# Patient Record
Sex: FEMALE | Race: ASIAN | NOT HISPANIC OR LATINO | URBAN - METROPOLITAN AREA
[De-identification: names, ages, dates, MRNs, and addresses within clinical notes are randomized per-mention and may not be internally consistent; named-entity substitution may affect disease eponyms.]

---

## 2021-12-06 PROBLEM — Z00.00 ENCOUNTER FOR PREVENTIVE HEALTH EXAMINATION: Status: ACTIVE | Noted: 2021-12-06

## 2021-12-14 PROBLEM — B18.1 HEPATITIS B CARRIER: Status: RESOLVED | Noted: 2021-12-14 | Resolved: 2021-12-14

## 2021-12-14 PROBLEM — Z86.79 HISTORY OF HYPERTENSION: Status: RESOLVED | Noted: 2021-12-14 | Resolved: 2021-12-14

## 2021-12-14 PROBLEM — Z86.2 HISTORY OF ANEMIA: Status: RESOLVED | Noted: 2021-12-14 | Resolved: 2021-12-14

## 2021-12-14 RX ORDER — TRIAMCINOLONE ACETONIDE 1 MG/G
0.1 PASTE DENTAL
Refills: 0 | Status: ACTIVE | COMMUNITY
Start: 2021-12-14

## 2021-12-14 RX ORDER — TENOFOVIR ALAFENAMIDE 25 MG/1
25 TABLET ORAL
Refills: 0 | Status: ACTIVE | COMMUNITY
Start: 2021-12-14

## 2021-12-14 RX ORDER — IRON POLYSACCHARIDE COMPLEX 150 MG
150 CAPSULE ORAL
Refills: 0 | Status: ACTIVE | COMMUNITY
Start: 2021-12-14

## 2021-12-14 RX ORDER — METOPROLOL SUCCINATE 50 MG/1
50 TABLET, EXTENDED RELEASE ORAL
Qty: 30 | Refills: 1 | Status: ACTIVE | COMMUNITY
Start: 2021-12-14

## 2021-12-14 RX ORDER — NORETHINDRONE 0.35 MG/1
0.35 TABLET ORAL DAILY
Refills: 0 | Status: ACTIVE | COMMUNITY
Start: 2021-12-14

## 2021-12-14 RX ORDER — IRON, FOLIC ACID, CYANOCOBALAMIN, ASCORBIC ACID, AND DOCUSATE SODIUM 90; 1; 12; 120; 50 MG/1; MG/1; UG/1; MG/1; MG/1
90-1 TABLET, FILM COATED ORAL
Refills: 0 | Status: ACTIVE | COMMUNITY
Start: 2021-12-14

## 2021-12-14 RX ORDER — FERRIC CITRATE 210 MG/1
1 GM TABLET, COATED ORAL
Refills: 0 | Status: ACTIVE | COMMUNITY
Start: 2021-12-14

## 2021-12-17 ENCOUNTER — OUTPATIENT (OUTPATIENT)
Dept: OUTPATIENT SERVICES | Facility: HOSPITAL | Age: 51
LOS: 1 days | End: 2021-12-17
Payer: COMMERCIAL

## 2021-12-17 ENCOUNTER — APPOINTMENT (OUTPATIENT)
Dept: THORACIC SURGERY | Facility: CLINIC | Age: 51
End: 2021-12-17
Payer: COMMERCIAL

## 2021-12-17 VITALS
HEIGHT: 62 IN | OXYGEN SATURATION: 96 % | WEIGHT: 137 LBS | SYSTOLIC BLOOD PRESSURE: 151 MMHG | TEMPERATURE: 97.4 F | BODY MASS INDEX: 25.21 KG/M2 | HEART RATE: 63 BPM | DIASTOLIC BLOOD PRESSURE: 74 MMHG | RESPIRATION RATE: 18 BRPM

## 2021-12-17 DIAGNOSIS — Z86.79 PERSONAL HISTORY OF OTHER DISEASES OF THE CIRCULATORY SYSTEM: ICD-10-CM

## 2021-12-17 DIAGNOSIS — Z86.2 PERSONAL HISTORY OF DISEASES OF THE BLOOD AND BLOOD-FORMING ORGANS AND CERTAIN DISORDERS INVOLVING THE IMMUNE MECHANISM: ICD-10-CM

## 2021-12-17 DIAGNOSIS — R91.8 OTHER NONSPECIFIC ABNORMAL FINDING OF LUNG FIELD: ICD-10-CM

## 2021-12-17 DIAGNOSIS — Z01.818 ENCOUNTER FOR OTHER PREPROCEDURAL EXAMINATION: ICD-10-CM

## 2021-12-17 DIAGNOSIS — B18.1 CHRONIC VIRAL HEPATITIS B W/OUT DELTA-AGENT: ICD-10-CM

## 2021-12-17 DIAGNOSIS — R91.1 SOLITARY PULMONARY NODULE: ICD-10-CM

## 2021-12-17 LAB
ALBUMIN SERPL ELPH-MCNC: 4.5 G/DL — SIGNIFICANT CHANGE UP (ref 3.3–5)
ALP SERPL-CCNC: 47 U/L — SIGNIFICANT CHANGE UP (ref 40–120)
ALT FLD-CCNC: 16 U/L — SIGNIFICANT CHANGE UP (ref 10–45)
ANION GAP SERPL CALC-SCNC: 9 MMOL/L — SIGNIFICANT CHANGE UP (ref 5–17)
APPEARANCE UR: CLEAR — SIGNIFICANT CHANGE UP
APTT BLD: 36.9 SEC — HIGH (ref 27.5–35.5)
AST SERPL-CCNC: 16 U/L — SIGNIFICANT CHANGE UP (ref 10–40)
BASOPHILS # BLD AUTO: 0.04 K/UL — SIGNIFICANT CHANGE UP (ref 0–0.2)
BASOPHILS NFR BLD AUTO: 0.5 % — SIGNIFICANT CHANGE UP (ref 0–2)
BILIRUB SERPL-MCNC: 1 MG/DL — SIGNIFICANT CHANGE UP (ref 0.2–1.2)
BILIRUB UR-MCNC: NEGATIVE — SIGNIFICANT CHANGE UP
BUN SERPL-MCNC: 9 MG/DL — SIGNIFICANT CHANGE UP (ref 7–23)
CALCIUM SERPL-MCNC: 9.4 MG/DL — SIGNIFICANT CHANGE UP (ref 8.4–10.5)
CHLORIDE SERPL-SCNC: 105 MMOL/L — SIGNIFICANT CHANGE UP (ref 96–108)
CO2 SERPL-SCNC: 29 MMOL/L — SIGNIFICANT CHANGE UP (ref 22–31)
COLOR SPEC: YELLOW — SIGNIFICANT CHANGE UP
CREAT SERPL-MCNC: 0.74 MG/DL — SIGNIFICANT CHANGE UP (ref 0.5–1.3)
DIFF PNL FLD: NEGATIVE — SIGNIFICANT CHANGE UP
EOSINOPHIL # BLD AUTO: 0.06 K/UL — SIGNIFICANT CHANGE UP (ref 0–0.5)
EOSINOPHIL NFR BLD AUTO: 0.8 % — SIGNIFICANT CHANGE UP (ref 0–6)
GLUCOSE SERPL-MCNC: 102 MG/DL — HIGH (ref 70–99)
GLUCOSE UR QL: NEGATIVE — SIGNIFICANT CHANGE UP
HCT VFR BLD CALC: 46.4 % — HIGH (ref 34.5–45)
HGB BLD-MCNC: 15.1 G/DL — SIGNIFICANT CHANGE UP (ref 11.5–15.5)
IMM GRANULOCYTES NFR BLD AUTO: 0.3 % — SIGNIFICANT CHANGE UP (ref 0–1.5)
INR BLD: 0.97 — SIGNIFICANT CHANGE UP (ref 0.88–1.16)
KETONES UR-MCNC: NEGATIVE — SIGNIFICANT CHANGE UP
LEUKOCYTE ESTERASE UR-ACNC: NEGATIVE — SIGNIFICANT CHANGE UP
LYMPHOCYTES # BLD AUTO: 1.98 K/UL — SIGNIFICANT CHANGE UP (ref 1–3.3)
LYMPHOCYTES # BLD AUTO: 26.7 % — SIGNIFICANT CHANGE UP (ref 13–44)
MCHC RBC-ENTMCNC: 30.1 PG — SIGNIFICANT CHANGE UP (ref 27–34)
MCHC RBC-ENTMCNC: 32.5 GM/DL — SIGNIFICANT CHANGE UP (ref 32–36)
MCV RBC AUTO: 92.4 FL — SIGNIFICANT CHANGE UP (ref 80–100)
MONOCYTES # BLD AUTO: 0.39 K/UL — SIGNIFICANT CHANGE UP (ref 0–0.9)
MONOCYTES NFR BLD AUTO: 5.3 % — SIGNIFICANT CHANGE UP (ref 2–14)
NEUTROPHILS # BLD AUTO: 4.92 K/UL — SIGNIFICANT CHANGE UP (ref 1.8–7.4)
NEUTROPHILS NFR BLD AUTO: 66.4 % — SIGNIFICANT CHANGE UP (ref 43–77)
NITRITE UR-MCNC: NEGATIVE — SIGNIFICANT CHANGE UP
NRBC # BLD: 0 /100 WBCS — SIGNIFICANT CHANGE UP (ref 0–0)
PH UR: 7 — SIGNIFICANT CHANGE UP (ref 5–8)
PLATELET # BLD AUTO: 287 K/UL — SIGNIFICANT CHANGE UP (ref 150–400)
POTASSIUM SERPL-MCNC: 4.3 MMOL/L — SIGNIFICANT CHANGE UP (ref 3.5–5.3)
POTASSIUM SERPL-SCNC: 4.3 MMOL/L — SIGNIFICANT CHANGE UP (ref 3.5–5.3)
PROT SERPL-MCNC: 7.9 G/DL — SIGNIFICANT CHANGE UP (ref 6–8.3)
PROT UR-MCNC: NEGATIVE MG/DL — SIGNIFICANT CHANGE UP
PROTHROM AB SERPL-ACNC: 11.6 SEC — SIGNIFICANT CHANGE UP (ref 10.6–13.6)
RBC # BLD: 5.02 M/UL — SIGNIFICANT CHANGE UP (ref 3.8–5.2)
RBC # FLD: 12.4 % — SIGNIFICANT CHANGE UP (ref 10.3–14.5)
SODIUM SERPL-SCNC: 143 MMOL/L — SIGNIFICANT CHANGE UP (ref 135–145)
SP GR SPEC: 1.01 — SIGNIFICANT CHANGE UP (ref 1–1.03)
UROBILINOGEN FLD QL: 0.2 E.U./DL — SIGNIFICANT CHANGE UP
WBC # BLD: 7.41 K/UL — SIGNIFICANT CHANGE UP (ref 3.8–10.5)
WBC # FLD AUTO: 7.41 K/UL — SIGNIFICANT CHANGE UP (ref 3.8–10.5)

## 2021-12-17 PROCEDURE — 81003 URINALYSIS AUTO W/O SCOPE: CPT

## 2021-12-17 PROCEDURE — 85730 THROMBOPLASTIN TIME PARTIAL: CPT

## 2021-12-17 PROCEDURE — 80053 COMPREHEN METABOLIC PANEL: CPT

## 2021-12-17 PROCEDURE — 85025 COMPLETE CBC W/AUTO DIFF WBC: CPT

## 2021-12-17 PROCEDURE — 86900 BLOOD TYPING SEROLOGIC ABO: CPT

## 2021-12-17 PROCEDURE — 36415 COLL VENOUS BLD VENIPUNCTURE: CPT

## 2021-12-17 PROCEDURE — 86850 RBC ANTIBODY SCREEN: CPT

## 2021-12-17 PROCEDURE — 99204 OFFICE O/P NEW MOD 45 MIN: CPT

## 2021-12-17 PROCEDURE — 85610 PROTHROMBIN TIME: CPT

## 2021-12-17 PROCEDURE — 86901 BLOOD TYPING SEROLOGIC RH(D): CPT

## 2021-12-19 PROBLEM — R91.8 LUNG MASS: Status: ACTIVE | Noted: 2021-12-14

## 2021-12-19 NOTE — PHYSICAL EXAM
[General Appearance - Alert] : alert [] : no respiratory distress [Respiration, Rhythm And Depth] : normal respiratory rhythm and effort [Exaggerated Use Of Accessory Muscles For Inspiration] : no accessory muscle use [Auscultation Breath Sounds / Voice Sounds] : lungs were clear to auscultation bilaterally [Apical Impulse] : the apical impulse was normal [Heart Rate And Rhythm] : heart rate was normal and rhythm regular [Heart Sounds] : normal S1 and S2 [Examination Of The Chest] : the chest was normal in appearance [2+] : left 2+ [Skin Color & Pigmentation] : normal skin color and pigmentation [Oriented To Time, Place, And Person] : oriented to person, place, and time

## 2021-12-21 NOTE — ASSESSMENT
[FreeTextEntry1] : 51 year old female with a past medical hx of gastritis, chronic hep B carrier (on Baraclude and Viread since 2/2012) hypertension, uterine fibroid/leiomyoma of uterus, presents for an initial evaluation and management of abnormal CT - mass to right middle lobe. Referred by Dr. Santiago Agosto\par \par MRI abdomen 11/18/21: subcentimeter T2 lesion in right hepatic lobe; likely hemangioma; 2cm pleural based enhancing mass in anterior RML; 1.9cm L adnexal cyst. \par \par CT chest 12/4/21:\par - 2.1cm smoothly marginated pleural based mass in the anterior right middle lobe. Findings may represent a solitary fibrous tumor of the pleura or schwannoma, among other etiologies. Histologic correlation is recommended.\par -two 2mm right middle lobe nodules. \par -hepatic hemangioma.\par \par Patient denies cough, hemoptysis, shortness of breath, weight change, nausea, vomiting, diarrhea, chest pain, fever, or any recent illnesses or hospitalizations. \par \par CT imaging was reviewed and with evidence of a 2.1cm pleural based mass in the anterior right middle lobe. To further evaluate will obtain a PET CT to r/o extrathoracic disease. If negative for extrathoracic disease, will perform a RIGHT VATS robotic assisted excision of pleural nodule. Risks and benefits were discussed with the patient and she choses to proceed.\par \par Plan:\par 1. PET CT\par 2. RIGHT VATS robotic assisted excision of pleural nodule\par 3. Medical clearance \par

## 2021-12-21 NOTE — HISTORY OF PRESENT ILLNESS
[FreeTextEntry1] : 51 year old female with a past medical hx of gastritis, chronic hep B carrier (on Baraclude and Viread since 2/2012) hypertension, uterine fibroid/leiomyoma of uterus, presents for an initial evaluation and management of abnormal CT - mass to right middle lobe. Referred by Dr. Santiago Agosto\par \par MRI abdomen 11/18/21: subcentimeter T2 lesion in right hepatic lobe; likely hemangioma; 2cm pleural based enhancing mass in anterior RML; 1.9cm L adnexal cyst. \par \par CT chest 12/4/21:\par - 2.1cm smoothly marginated pleural based mass in the anterior right middle lobe. Findings may represent a solitary fibrous tumor of the pluera or schwannoma, among other etiologies. Histologic correlation is recommended.\par -two 2mm right middle lobe nodules. \par -hepatic hemangioma

## 2021-12-21 NOTE — CONSULT LETTER
[Dear  ___] : Dear  [unfilled], [Courtesy Letter:] : I had the pleasure of seeing your patient, [unfilled], in my office today. [Please see my note below.] : Please see my note below. [Consult Closing:] : Thank you very much for allowing me to participate in the care of this patient.  If you have any questions, please do not hesitate to contact me. [Sincerely,] : Sincerely, [FreeTextEntry3] : Zen Morgan MD\par Professor, Cardiovascular & Thoracic Surgery\par [facility] [employer]\par Director of the [facility] \par Director of Thoracic Surgery, [facility]\par \par [facility]\par [address]\par [facility] 4th Floor\par [place] [address]\par Phone: [phone]\par Fax: [phone]\par

## 2022-01-05 VITALS
OXYGEN SATURATION: 96 % | WEIGHT: 136.91 LBS | HEIGHT: 62 IN | DIASTOLIC BLOOD PRESSURE: 74 MMHG | TEMPERATURE: 97 F | RESPIRATION RATE: 18 BRPM | SYSTOLIC BLOOD PRESSURE: 151 MMHG | HEART RATE: 63 BPM

## 2022-01-05 NOTE — H&P ADULT - HISTORY OF PRESENT ILLNESS
51 year old female with a past medical hx of gastritis, chronic hep B carrier (on Baraclude and Viread since 2/2012) hypertension, uterine fibroid/leiomyoma of uterus, presents for an initial evaluation and management of abnormal CT - mass to right middle lobe. Referred by Dr. Santiago Agosto    MRI abdomen 11/18/21: subcentimeter T2 lesion in right hepatic lobe; likely hemangioma; 2cm pleural based enhancing mass in anterior RML; 1.9cm L adnexal cyst.     CT chest 12/4/21:  - 2.1cm smoothly marginated pleural based mass in the anterior right middle lobe. Findings may represent a solitary fibrous tumor of the pluera or schwannoma, among other etiologies. Histologic correlation is recommended.  -two 2mm right middle lobe nodules.   -hepatic hemangioma      51 year old female with a past medical hx of gastritis, chronic hep B carrier (on Baraclude and Viread since 2/2012) hypertension, uterine fibroid/leiomyoma of uterus, presents for an initial evaluation and management of abnormal CT - mass to right middle lobe. Referred by Dr. Santiago Agosto    MRI abdomen 11/18/21: subcentimeter T2 lesion in right hepatic lobe; likely hemangioma; 2cm pleural based enhancing mass in anterior RML; 1.9cm L adnexal cyst.     CT chest 12/4/21:  - 2.1cm smoothly marginated pleural based mass in the anterior right middle lobe. Findings may represent a solitary fibrous tumor of the pluera or schwannoma, among other etiologies. Histologic correlation is recommended.  -two 2mm right middle lobe nodules.   -hepatic hemangioma         Patient seen in SDA   Patient seen in same day holding area; Reports no changes to PMHx or medications since last seen by our team. Denies acute or current SOB, chest pain, palpitation, N/V/D, fever/chills, recent illness, or any other concerning symptoms. patient NPO since midnight with exception of BB this AM and tylenol given in PACU with sip of water.

## 2022-01-05 NOTE — H&P ADULT - NSICDXPASTMEDICALHX_GEN_ALL_CORE_FT
PAST MEDICAL HISTORY:  Anemia     Gastritis     Hepatitis B carrier    HTN (hypertension)     Lung mass     Uterine fibroid

## 2022-01-05 NOTE — H&P ADULT - NSHPLABSRESULTS_GEN_ALL_CORE
MRI abdomen 11/18/21: subcentimeter T2 lesion in right hepatic lobe; likely hemangioma; 2cm pleural based enhancing mass in anterior RML; 1.9cm L adnexal cyst.     CT chest 12/4/21:  - 2.1cm smoothly marginated pleural based mass in the anterior right middle lobe. Findings may represent a solitary fibrous tumor of the pluera or schwannoma, among other etiologies. Histologic correlation is recommended.  -two 2mm right middle lobe nodules.   -hepatic hemangioma    PET CT completed on 12/29/21:  -persistent 2cm smoothly marginated pleural based mass along the anterior right middle lobe. This demonstrates minimal FDG activity (SUV max 1.7). Differential considerations remain the same including solitary fibrous tumor of the pleura and schwannoma.   -focal FDG activity (SUV max 5.9) at the level of the low rectum/anus. This may be physiological in nature. Underlying rectal neoplasm is not excluded. Correlation with colonoscopy is recommended  -otherwise, no suspicious areas of FDG uptake within the neck, chest, abdomen, or pelvis  -uterine fibroids

## 2022-01-05 NOTE — H&P ADULT - NSHPPHYSICALEXAM_GEN_ALL_CORE
Neuro: A+O x 3, non-focal, speech clear and intact  HEENT: PERRL, EOMI, oral mucosa pink and moist  Neck: supple, no JVD  CV: regular rate, regular rhythm, +S1S2, no murmurs or rub  Pulm/chest: lung sounds CTA and equal bilaterally, no accessory muscle use noted  Abd: soft, NT, ND, +BS  Ext: JIMENEZ x 4, no C/C/E  Skin: warm, well perfused, no rashes

## 2022-01-05 NOTE — H&P ADULT - ASSESSMENT
51 year old female with a past medical hx of gastritis, chronic hep B carrier (on Baraclude and Viread since 2/2012) hypertension, uterine fibroid/leiomyoma of uterus, presents for an initial evaluation and management of abnormal CT - mass to right middle lobe. Referred by Dr. Santiago Agosto    MRI abdomen 11/18/21: subcentimeter T2 lesion in right hepatic lobe; likely hemangioma; 2cm pleural based enhancing mass in anterior RML; 1.9cm L adnexal cyst.     CT chest 12/4/21:  - 2.1cm smoothly marginated pleural based mass in the anterior right middle lobe. Findings may represent a solitary fibrous tumor of the pleura or schwannoma, among other etiologies. Histologic correlation is recommended.  -two 2mm right middle lobe nodules.   -hepatic hemangioma.    Patient denies cough, hemoptysis, shortness of breath, weight change, nausea, vomiting, diarrhea, chest pain, fever, or any recent illnesses or hospitalizations.     CT imaging was reviewed and with evidence of a 2.1cm pleural based mass in the anterior right middle lobe. To further evaluate will obtain a PET CT to r/o extrathoracic disease. If negative for extrathoracic disease, will perform a RIGHT VATS robotic assisted excision of pleural nodule. Risks and benefits were discussed with the patient and she choses to proceed.    Plan:  1. RIGHT VATS robotic assisted excision of pleural nodule  3. Medical clearance    51 year old female with a past medical hx of gastritis, chronic hep B carrier (on Baraclude and Viread since 2/2012) hypertension, uterine fibroid/leiomyoma of uterus, presents for an initial evaluation and management of abnormal CT - mass to right middle lobe. Referred by Dr. Santiago Agosto    MRI abdomen 11/18/21: subcentimeter T2 lesion in right hepatic lobe; likely hemangioma; 2cm pleural based enhancing mass in anterior RML; 1.9cm L adnexal cyst.     CT chest 12/4/21:  - 2.1cm smoothly marginated pleural based mass in the anterior right middle lobe. Findings may represent a solitary fibrous tumor of the pleura or schwannoma, among other etiologies. Histologic correlation is recommended.  -two 2mm right middle lobe nodules.   -hepatic hemangioma.    Patient denies cough, hemoptysis, shortness of breath, weight change, nausea, vomiting, diarrhea, chest pain, fever, or any recent illnesses or hospitalizations.     CT imaging was reviewed and with evidence of a 2.1cm pleural based mass in the anterior right middle lobe. To further evaluate will obtain a PET CT to r/o extrathoracic disease. If negative for extrathoracic disease, will perform a RIGHT VATS robotic assisted excision of pleural nodule. Risks and benefits were discussed with the patient and she choses to proceed.    Plan:  1. RIGHT VATS robotic assisted excision of pleural nodule  3. Medical clearance     Admit under Dr. Morgan via same day surgery. Consent signed, placed on chart.  Risks/benefits reviewed, patient understands and agrees. T&S ordered and blood products placed on hold for OR.  To [facility]Lach  post-op.

## 2022-01-08 ENCOUNTER — LABORATORY RESULT (OUTPATIENT)
Age: 52
End: 2022-01-08

## 2022-01-10 ENCOUNTER — NON-APPOINTMENT (OUTPATIENT)
Age: 52
End: 2022-01-10

## 2022-01-10 ENCOUNTER — TRANSCRIPTION ENCOUNTER (OUTPATIENT)
Age: 52
End: 2022-01-10

## 2022-01-10 RX ORDER — FERRIC CITRATE 210 MG/1
0 TABLET, COATED ORAL
Qty: 0 | Refills: 0 | DISCHARGE

## 2022-01-10 RX ORDER — BENZOYL PEROXIDE MICRONIZED 5.8 %
1 TOWELETTE (EA) TOPICAL
Qty: 0 | Refills: 0 | DISCHARGE

## 2022-01-10 NOTE — PATIENT PROFILE ADULT - FALL HARM RISK - UNIVERSAL INTERVENTIONS
Bed in lowest position, wheels locked, appropriate side rails in place/Call bell, personal items and telephone in reach/Instruct patient to call for assistance before getting out of bed or chair/Non-slip footwear when patient is out of bed/[place] to call system/Physically safe environment - no spills, clutter or unnecessary equipment/Purposeful Proactive Rounding/Room/bathroom lighting operational, light cord in reach

## 2022-01-11 ENCOUNTER — RESULT REVIEW (OUTPATIENT)
Age: 52
End: 2022-01-11

## 2022-01-11 ENCOUNTER — APPOINTMENT (OUTPATIENT)
Dept: THORACIC SURGERY | Facility: HOSPITAL | Age: 52
End: 2022-01-11

## 2022-01-11 ENCOUNTER — INPATIENT (INPATIENT)
Facility: HOSPITAL | Age: 52
LOS: 0 days | Discharge: HOME | DRG: 164 | End: 2022-01-12
Attending: THORACIC SURGERY (CARDIOTHORACIC VASCULAR SURGERY) | Admitting: THORACIC SURGERY (CARDIOTHORACIC VASCULAR SURGERY)
Payer: COMMERCIAL

## 2022-01-11 LAB
ANION GAP SERPL CALC-SCNC: 10 MMOL/L — SIGNIFICANT CHANGE UP (ref 5–17)
APTT BLD: 33.3 SEC — SIGNIFICANT CHANGE UP (ref 27.5–35.5)
BUN SERPL-MCNC: 10 MG/DL — SIGNIFICANT CHANGE UP (ref 7–23)
CALCIUM SERPL-MCNC: 8.9 MG/DL — SIGNIFICANT CHANGE UP (ref 8.4–10.5)
CHLORIDE SERPL-SCNC: 107 MMOL/L — SIGNIFICANT CHANGE UP (ref 96–108)
CO2 SERPL-SCNC: 25 MMOL/L — SIGNIFICANT CHANGE UP (ref 22–31)
CREAT SERPL-MCNC: 0.74 MG/DL — SIGNIFICANT CHANGE UP (ref 0.5–1.3)
GLUCOSE SERPL-MCNC: 111 MG/DL — HIGH (ref 70–99)
HCT VFR BLD CALC: 44.5 % — SIGNIFICANT CHANGE UP (ref 34.5–45)
HGB BLD-MCNC: 13.9 G/DL — SIGNIFICANT CHANGE UP (ref 11.5–15.5)
INR BLD: 0.98 — SIGNIFICANT CHANGE UP (ref 0.88–1.16)
MAGNESIUM SERPL-MCNC: 2 MG/DL — SIGNIFICANT CHANGE UP (ref 1.6–2.6)
MCHC RBC-ENTMCNC: 28.4 PG — SIGNIFICANT CHANGE UP (ref 27–34)
MCHC RBC-ENTMCNC: 31.2 GM/DL — LOW (ref 32–36)
MCV RBC AUTO: 91 FL — SIGNIFICANT CHANGE UP (ref 80–100)
NRBC # BLD: 0 /100 WBCS — SIGNIFICANT CHANGE UP (ref 0–0)
PHOSPHATE SERPL-MCNC: 4.3 MG/DL — SIGNIFICANT CHANGE UP (ref 2.5–4.5)
PLATELET # BLD AUTO: 236 K/UL — SIGNIFICANT CHANGE UP (ref 150–400)
POTASSIUM SERPL-MCNC: 4.2 MMOL/L — SIGNIFICANT CHANGE UP (ref 3.5–5.3)
POTASSIUM SERPL-SCNC: 4.2 MMOL/L — SIGNIFICANT CHANGE UP (ref 3.5–5.3)
PROTHROM AB SERPL-ACNC: 11.8 SEC — SIGNIFICANT CHANGE UP (ref 10.6–13.6)
RBC # BLD: 4.89 M/UL — SIGNIFICANT CHANGE UP (ref 3.8–5.2)
RBC # FLD: 12 % — SIGNIFICANT CHANGE UP (ref 10.3–14.5)
SODIUM SERPL-SCNC: 142 MMOL/L — SIGNIFICANT CHANGE UP (ref 135–145)
WBC # BLD: 6.87 K/UL — SIGNIFICANT CHANGE UP (ref 3.8–10.5)
WBC # FLD AUTO: 6.87 K/UL — SIGNIFICANT CHANGE UP (ref 3.8–10.5)

## 2022-01-11 PROCEDURE — 71045 X-RAY EXAM CHEST 1 VIEW: CPT | Mod: 26

## 2022-01-11 PROCEDURE — 88341 IMHCHEM/IMCYTCHM EA ADD ANTB: CPT | Mod: 26

## 2022-01-11 PROCEDURE — 88307 TISSUE EXAM BY PATHOLOGIST: CPT | Mod: 26

## 2022-01-11 PROCEDURE — 32666 THORACOSCOPY W/WEDGE RESECT: CPT

## 2022-01-11 PROCEDURE — S2900 ROBOTIC SURGICAL SYSTEM: CPT | Mod: NC

## 2022-01-11 PROCEDURE — 88342 IMHCHEM/IMCYTCHM 1ST ANTB: CPT | Mod: 26

## 2022-01-11 DEVICE — CHEST DRAIN THORACIC PVC 24FR STRAIGHT
Type: IMPLANTABLE DEVICE | Status: NON-FUNCTIONAL
Removed: 2022-01-11

## 2022-01-11 DEVICE — STAPLER COVIDIEN TRI-STAPLE CURVED 45MM TAN RELOAD
Type: IMPLANTABLE DEVICE | Status: NON-FUNCTIONAL
Removed: 2022-01-11

## 2022-01-11 DEVICE — STAPLER COVIDIEN TRI-STAPLE CURVED 45MM PURPLE RELOAD
Type: IMPLANTABLE DEVICE | Status: NON-FUNCTIONAL
Removed: 2022-01-11

## 2022-01-11 DEVICE — STAPLER COVIDIEN TRI-STAPLE 60MM PURPLE RELOAD
Type: IMPLANTABLE DEVICE | Status: NON-FUNCTIONAL
Removed: 2022-01-11

## 2022-01-11 DEVICE — STAPLER COVIDIEN TRI-STAPLE 60MM BLACK RELOAD
Type: IMPLANTABLE DEVICE | Status: NON-FUNCTIONAL
Removed: 2022-01-11

## 2022-01-11 DEVICE — STAPLER COVIDIEN TRI-STAPLE 45MM PURPLE INTELLIGENT RELOAD
Type: IMPLANTABLE DEVICE | Status: NON-FUNCTIONAL
Removed: 2022-01-11

## 2022-01-11 DEVICE — CHEST DRAIN THORACIC PVC 28FR STRAIGHT
Type: IMPLANTABLE DEVICE | Status: NON-FUNCTIONAL
Removed: 2022-01-11

## 2022-01-11 DEVICE — STAPLER COVIDIEN TRI-STAPLE 60MM PURPLE INTELLIGENT RELOAD
Type: IMPLANTABLE DEVICE | Status: NON-FUNCTIONAL
Removed: 2022-01-11

## 2022-01-11 DEVICE — LIGATING CLIPS WECK HORIZON SMALL-WIDE (RED) 6
Type: IMPLANTABLE DEVICE | Status: NON-FUNCTIONAL
Removed: 2022-01-11

## 2022-01-11 DEVICE — STAPLER COVIDIEN TRI-STAPLE 45MM BLACK RELOAD
Type: IMPLANTABLE DEVICE | Status: NON-FUNCTIONAL
Removed: 2022-01-11

## 2022-01-11 DEVICE — STAPLER COVIDIEN TRI-STAPLE CURVED 60MM TAN RELOAD
Type: IMPLANTABLE DEVICE | Status: NON-FUNCTIONAL
Removed: 2022-01-11

## 2022-01-11 DEVICE — LIGATING CLIPS WECK HEMOLOK POLYMER MEDIUM-LARGE (GREEN) 6
Type: IMPLANTABLE DEVICE | Status: NON-FUNCTIONAL
Removed: 2022-01-11

## 2022-01-11 DEVICE — SURGICEL 4 X 8"
Type: IMPLANTABLE DEVICE | Status: NON-FUNCTIONAL
Removed: 2022-01-11

## 2022-01-11 DEVICE — STAPLER COVIDIEN TRI-STAPLE CURVED 30MM TAN RELOAD
Type: IMPLANTABLE DEVICE | Status: NON-FUNCTIONAL
Removed: 2022-01-11

## 2022-01-11 RX ORDER — ACETAMINOPHEN 500 MG
975 TABLET ORAL ONCE
Refills: 0 | Status: COMPLETED | OUTPATIENT
Start: 2022-01-11 | End: 2022-01-11

## 2022-01-11 RX ORDER — BUPIVACAINE 13.3 MG/ML
20 INJECTION, SUSPENSION, LIPOSOMAL INFILTRATION ONCE
Refills: 0 | Status: DISCONTINUED | OUTPATIENT
Start: 2022-01-11 | End: 2022-01-12

## 2022-01-11 RX ORDER — ACETAMINOPHEN 500 MG
650 TABLET ORAL EVERY 6 HOURS
Refills: 0 | Status: DISCONTINUED | OUTPATIENT
Start: 2022-01-11 | End: 2022-01-12

## 2022-01-11 RX ORDER — CEFAZOLIN SODIUM 1 G
2000 VIAL (EA) INJECTION EVERY 8 HOURS
Refills: 0 | Status: DISCONTINUED | OUTPATIENT
Start: 2022-01-11 | End: 2022-01-12

## 2022-01-11 RX ORDER — NORETHINDRONE 0.35 MG/1
1 TABLET ORAL
Qty: 0 | Refills: 0 | DISCHARGE

## 2022-01-11 RX ORDER — TENOFOVIR DISOPROXIL FUMARATE 300 MG/1
25 TABLET, FILM COATED ORAL EVERY 24 HOURS
Refills: 0 | Status: DISCONTINUED | OUTPATIENT
Start: 2022-01-11 | End: 2022-01-12

## 2022-01-11 RX ORDER — KETOROLAC TROMETHAMINE 30 MG/ML
15 SYRINGE (ML) INJECTION EVERY 6 HOURS
Refills: 0 | Status: DISCONTINUED | OUTPATIENT
Start: 2022-01-11 | End: 2022-01-12

## 2022-01-11 RX ORDER — SODIUM CHLORIDE 9 MG/ML
1000 INJECTION, SOLUTION INTRAVENOUS
Refills: 0 | Status: DISCONTINUED | OUTPATIENT
Start: 2022-01-11 | End: 2022-01-12

## 2022-01-11 RX ORDER — LIDOCAINE 4 G/100G
1 CREAM TOPICAL DAILY
Refills: 0 | Status: DISCONTINUED | OUTPATIENT
Start: 2022-01-11 | End: 2022-01-12

## 2022-01-11 RX ORDER — BENZOYL PEROXIDE MICRONIZED 5.8 %
0 TOWELETTE (EA) TOPICAL
Qty: 0 | Refills: 0 | DISCHARGE

## 2022-01-11 RX ORDER — HEPARIN SODIUM 5000 [USP'U]/ML
5000 INJECTION INTRAVENOUS; SUBCUTANEOUS EVERY 8 HOURS
Refills: 0 | Status: DISCONTINUED | OUTPATIENT
Start: 2022-01-11 | End: 2022-01-12

## 2022-01-11 RX ORDER — ONDANSETRON 8 MG/1
4 TABLET, FILM COATED ORAL ONCE
Refills: 0 | Status: COMPLETED | OUTPATIENT
Start: 2022-01-11 | End: 2022-01-11

## 2022-01-11 RX ORDER — ACETAMINOPHEN 500 MG
1000 TABLET ORAL ONCE
Refills: 0 | Status: COMPLETED | OUTPATIENT
Start: 2022-01-11 | End: 2022-01-11

## 2022-01-11 RX ORDER — TENOFOVIR DISOPROXIL FUMARATE 300 MG/1
25 TABLET, FILM COATED ORAL DAILY
Refills: 0 | Status: DISCONTINUED | OUTPATIENT
Start: 2022-01-11 | End: 2022-01-11

## 2022-01-11 RX ORDER — TENOFOVIR DISOPROXIL FUMARATE 300 MG/1
1 TABLET, FILM COATED ORAL
Qty: 0 | Refills: 0 | DISCHARGE

## 2022-01-11 RX ORDER — METOPROLOL TARTRATE 50 MG
1 TABLET ORAL
Qty: 0 | Refills: 0 | DISCHARGE

## 2022-01-11 RX ADMIN — Medication 100 MILLIGRAM(S): at 21:30

## 2022-01-11 RX ADMIN — TENOFOVIR DISOPROXIL FUMARATE 25 MILLIGRAM(S): 300 TABLET, FILM COATED ORAL at 20:58

## 2022-01-11 RX ADMIN — SODIUM CHLORIDE 50 MILLILITER(S): 9 INJECTION, SOLUTION INTRAVENOUS at 21:31

## 2022-01-11 RX ADMIN — LIDOCAINE 1 PATCH: 4 CREAM TOPICAL at 14:39

## 2022-01-11 RX ADMIN — Medication 15 MILLIGRAM(S): at 21:31

## 2022-01-11 RX ADMIN — Medication 400 MILLIGRAM(S): at 18:08

## 2022-01-11 RX ADMIN — LIDOCAINE 1 PATCH: 4 CREAM TOPICAL at 18:08

## 2022-01-11 RX ADMIN — HEPARIN SODIUM 5000 UNIT(S): 5000 INJECTION INTRAVENOUS; SUBCUTANEOUS at 21:31

## 2022-01-11 RX ADMIN — Medication 1000 MILLIGRAM(S): at 18:08

## 2022-01-11 RX ADMIN — Medication 15 MILLIGRAM(S): at 23:06

## 2022-01-11 NOTE — BRIEF OPERATIVE NOTE - COMMENTS
I first assisted for the entirety of the case, including but not limited to opening, port placement/docking, robotic instrumentation, chest tube placement, and closure.

## 2022-01-11 NOTE — PACU DISCHARGE NOTE - COMMENTS
Report given to Juliane CATALAN. Right chest tube site intact. IV site intact, transported to room on monitor and O 2 2 LNC.

## 2022-01-11 NOTE — BRIEF OPERATIVE NOTE - NSICDXBRIEFPROCEDURE_GEN_ALL_CORE_FT
PROCEDURES:  Wedge resection, lung, middle lobe, right, robot-assisted 11-Jan-2022 13:52:41 R VATS RA RML wedge resection Stephanie Wiley N

## 2022-01-11 NOTE — BRIEF OPERATIVE NOTE - TYPE OF ANESTHESIA
Requested Prescriptions     Pending Prescriptions Disp Refills    baclofen (LIORESAL) 10 MG tablet 90 tablet 2     Sig: Take 1 tablet by mouth 3 times daily       Last office visit 5-  Next office visit [id number]  rafia 4-      This is a Baldem[facility] and Dr Chandler Weeks patient would you please fill this thank you
General

## 2022-01-12 ENCOUNTER — TRANSCRIPTION ENCOUNTER (OUTPATIENT)
Age: 52
End: 2022-01-12

## 2022-01-12 VITALS
SYSTOLIC BLOOD PRESSURE: 146 MMHG | OXYGEN SATURATION: 99 % | HEART RATE: 64 BPM | DIASTOLIC BLOOD PRESSURE: 72 MMHG | RESPIRATION RATE: 18 BRPM

## 2022-01-12 PROBLEM — R91.8 OTHER NONSPECIFIC ABNORMAL FINDING OF LUNG FIELD: Chronic | Status: ACTIVE | Noted: 2022-01-10

## 2022-01-12 PROBLEM — D25.9 LEIOMYOMA OF UTERUS, UNSPECIFIED: Chronic | Status: ACTIVE | Noted: 2022-01-10

## 2022-01-12 PROBLEM — D64.9 ANEMIA, UNSPECIFIED: Chronic | Status: ACTIVE | Noted: 2022-01-10

## 2022-01-12 PROBLEM — B19.10 UNSPECIFIED VIRAL HEPATITIS B WITHOUT HEPATIC COMA: Chronic | Status: ACTIVE | Noted: 2022-01-10

## 2022-01-12 PROBLEM — K29.70 GASTRITIS, UNSPECIFIED, WITHOUT BLEEDING: Chronic | Status: ACTIVE | Noted: 2022-01-10

## 2022-01-12 PROBLEM — I10 ESSENTIAL (PRIMARY) HYPERTENSION: Chronic | Status: ACTIVE | Noted: 2022-01-10

## 2022-01-12 LAB
ANION GAP SERPL CALC-SCNC: 9 MMOL/L — SIGNIFICANT CHANGE UP (ref 5–17)
BUN SERPL-MCNC: 8 MG/DL — SIGNIFICANT CHANGE UP (ref 7–23)
CALCIUM SERPL-MCNC: 9.1 MG/DL — SIGNIFICANT CHANGE UP (ref 8.4–10.5)
CHLORIDE SERPL-SCNC: 106 MMOL/L — SIGNIFICANT CHANGE UP (ref 96–108)
CO2 SERPL-SCNC: 25 MMOL/L — SIGNIFICANT CHANGE UP (ref 22–31)
CREAT SERPL-MCNC: 0.8 MG/DL — SIGNIFICANT CHANGE UP (ref 0.5–1.3)
GLUCOSE SERPL-MCNC: 109 MG/DL — HIGH (ref 70–99)
HCT VFR BLD CALC: 44.3 % — SIGNIFICANT CHANGE UP (ref 34.5–45)
HGB BLD-MCNC: 13.8 G/DL — SIGNIFICANT CHANGE UP (ref 11.5–15.5)
MAGNESIUM SERPL-MCNC: 2 MG/DL — SIGNIFICANT CHANGE UP (ref 1.6–2.6)
MCHC RBC-ENTMCNC: 28.5 PG — SIGNIFICANT CHANGE UP (ref 27–34)
MCHC RBC-ENTMCNC: 31.2 GM/DL — LOW (ref 32–36)
MCV RBC AUTO: 91.3 FL — SIGNIFICANT CHANGE UP (ref 80–100)
NRBC # BLD: 0 /100 WBCS — SIGNIFICANT CHANGE UP (ref 0–0)
PLATELET # BLD AUTO: 268 K/UL — SIGNIFICANT CHANGE UP (ref 150–400)
POTASSIUM SERPL-MCNC: 4.2 MMOL/L — SIGNIFICANT CHANGE UP (ref 3.5–5.3)
POTASSIUM SERPL-SCNC: 4.2 MMOL/L — SIGNIFICANT CHANGE UP (ref 3.5–5.3)
RBC # BLD: 4.85 M/UL — SIGNIFICANT CHANGE UP (ref 3.8–5.2)
RBC # FLD: 12.1 % — SIGNIFICANT CHANGE UP (ref 10.3–14.5)
SODIUM SERPL-SCNC: 140 MMOL/L — SIGNIFICANT CHANGE UP (ref 135–145)
WBC # BLD: 14.18 K/UL — HIGH (ref 3.8–10.5)
WBC # FLD AUTO: 14.18 K/UL — HIGH (ref 3.8–10.5)

## 2022-01-12 PROCEDURE — 71045 X-RAY EXAM CHEST 1 VIEW: CPT | Mod: 26

## 2022-01-12 RX ORDER — ACETAMINOPHEN 500 MG
2 TABLET ORAL
Qty: 56 | Refills: 0
Start: 2022-01-12 | End: 2022-01-18

## 2022-01-12 RX ORDER — POLYETHYLENE GLYCOL 3350 17 G/17G
17 POWDER, FOR SOLUTION ORAL
Qty: 1 | Refills: 0
Start: 2022-01-12

## 2022-01-12 RX ADMIN — LIDOCAINE 1 PATCH: 4 CREAM TOPICAL at 02:44

## 2022-01-12 RX ADMIN — Medication 650 MILLIGRAM(S): at 06:41

## 2022-01-12 RX ADMIN — Medication 100 MILLIGRAM(S): at 05:27

## 2022-01-12 RX ADMIN — Medication 650 MILLIGRAM(S): at 06:31

## 2022-01-12 RX ADMIN — HEPARIN SODIUM 5000 UNIT(S): 5000 INJECTION INTRAVENOUS; SUBCUTANEOUS at 05:27

## 2022-01-12 NOTE — PROGRESS NOTE ADULT - SUBJECTIVE AND OBJECTIVE BOX
Patient discussed on morning rounds with Dr. Hernández.    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 Morgan    Referring Physician: Dr. Santiago Agosto    SUBJECTIVE ASSESSMENT:  51y Female seen and examined. Patient states she feels well and is excited to go home, no complaints. Tolerating PO diet, ambulating independently. Denies lightheadedness, headache, CP, palpitations, SOB, abdominal pain, nausea, vomiting, fever, chills.    HOSPITAL COURSE:  51 year old female with a past medical hx of gastritis, chronic hep B carrier (on Baraclude and Viread since 2/2012) hypertension, uterine fibroid/leiomyoma of uterus, presents for an initial evaluation and management of abnormal CT on 12/4/21. CT showed  2.1cm mass to right middle lobe. MRI abdomen 11/18/21 showed 2cm pleural based enhancing mass in anterior RML. Referred to Dr. Morgan by Dr. Santiago Agosto. 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 Morgan. She was brought to the PACU in stable condition and then transferred to the stepdown unit. On POD1 there was minimal output to her CT. CT was pulled and repeat CXR showed no pneumothorax. Patient feels well and is excited to go home. Tolerating PO diet, ambulating independently, and satting well on room air. Per Dr. Hernández she is medically stable for discharge home today, 1/12/21.    Vital Signs Last 24 Hrs  T(C): 36 (12 Jan 2022 05:05), Max: 36.8 (11 Jan 2022 16:15)  T(F): 96.8 (12 Jan 2022 05:05), Max: 98.2 (11 Jan 2022 16:15)  HR: 62 (12 Jan 2022 09:30) (0 - 69)  BP: 126/85 (12 Jan 2022 08:43) (112/58 - 167/81)  BP(mean): 97 (12 Jan 2022 08:43) (80 - 117)  RR: 19 (12 Jan 2022 09:30) (16 - 19)  SpO2: 99% (12 Jan 2022 09:30) (99% - 100%)    EPICARDIAL WIRES REMOVED: N/A.  TIE DOWNS REMOVED: No - one suture in place to right chest.    PHYSICAL EXAM:    General: NAD, sitting comfortably in chair, conversing appropriately  Neurological: alert and oriented, UE and LE strength equal b/l, facial symmetry present  Cardiovascular: RRR, Clear S1 and S2, no murmurs appreciated  Respiratory: chest expansion symmetrical, CTA b/l, no wheezing noted  Gastrointestinal: +BS, soft, NT, ND  Extremities: moving spontaneously, no calf tenderness or edema.  Vascular: warm, well perfused. DP/PT pulses palpable b/l.  Incisions: R Vats incisions covered with dermabond. clean/dry/intact. no purulence or dehiscence. One suture in place to R chest.      LABS:                        13.8   14.18 )-----------( 268      ( 12 Jan 2022 07:29 )             44.3       COUMADIN:  No.           PT/INR - ( 11 Jan 2022 14:14 )   PT: 11.8 sec;   INR: 0.98          PTT - ( 11 Jan 2022 14:14 )  PTT:33.3 sec    01-12    140  |  106  |  8   ----------------------------<  109<H>  4.2   |  25  |  0.80    Ca    9.1      12 Jan 2022 07:29  Phos  4.3     01-11  Mg     2.0     01-12      Discharge CXR:    < from: Xray Chest 1 View-PORTABLE IMMEDIATE (Xray Chest 1 View-PORTABLE IMMEDIATE .) (01.11.22 @ 14:33) >  Findings/  impression: Right chest tubein place, no pneumothorax. Right   subcutaneous emphysema. Heart, lungs, mediastinum and thorax are   unremarkable. Gaseous distention of the stomach.    CXR 1/12: pending official read. no acute changes or obvious PTX.      
Patient discussed post-surgical with Dr. 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 Patient resting comfortably in minimal pain, recovering well from anesthesia.         Vital Signs Last 24 Hrs  T(C): 36 (11 Jan 2022 17:11), Max: 36.8 (11 Jan 2022 16:15)  T(F): 96.8 (11 Jan 2022 17:11), Max: 98.2 (11 Jan 2022 16:15)  HR: 56 (11 Jan 2022 16:15) (0 - 57)  BP: 129/63 (11 Jan 2022 16:15) (126/71 - 167/81)  BP(mean): 88 (11 Jan 2022 16:15) (88 - 117)  RR: 18 (11 Jan 2022 16:15) (18 - 18)  SpO2: 99% (11 Jan 2022 16:15) (99% - 100%)  I&O's Detail    11 Jan 2022 07:01  -  11 Jan 2022 17:32  --------------------------------------------------------  IN:    Lactated Ringers: 50 mL    Oral Fluid: 120 mL  Total IN: 170 mL    OUT:    Chest Tube (mL): 0 mL  Total OUT: 0 mL    Total NET: 170 mL      CHEST TUBE:  Yes, suction  ASHLEE DRAIN:  No.  EPICARDIAL WIRES: No.  TIE DOWNS: Yes  VELAZQUEZ: No.    PHYSICAL EXAM:  Neuro: A+O x 3, non-focal, speech clear and intact  HEENT: PERRL, EOMI, oral mucosa pink and moist  Neck: supple, no JVD  CV: regular rate, regular rhythm, +S1S2, no murmurs or rub  Pulm/chest: lung sounds CTA and equal bilaterally, no accessory muscle use noted  Abd: soft, NT, ND, +BS  Ext: JIMENEZ x 4, no C/C/E  Skin: warm, well perfused, no rashes  Incision: 1 chest tube on suction no airleak, incision dressed with clean dry dressing    LABS:                        13.9   6.87  )-----------( 236      ( 11 Jan 2022 14:14 )             44.5         PT/INR - ( 11 Jan 2022 14:14 )   PT: 11.8 sec;   INR: 0.98          PTT - ( 11 Jan 2022 14:14 )  PTT:33.3 sec    01-11    142  |  107  |  10  ----------------------------<  111<H>  4.2   |  25  |  0.74    Ca    8.9      11 Jan 2022 14:14  Phos  4.3     01-11  Mg     2.0     01-11            MEDICATIONS  (STANDING):  BUpivacaine liposome 1.3% Injectable (no eMAR) 20 milliLiter(s) Local Injection once  ceFAZolin   IVPB 2000 milliGRAM(s) IV Intermittent every 8 hours  heparin   Injectable 5000 Unit(s) SubCutaneous every 8 hours  lactated ringers. 1000 milliLiter(s) (50 mL/Hr) IV Continuous <Continuous>  lidocaine   4% Patch 1 Patch Transdermal daily  ondansetron Injectable 4 milliGRAM(s) IV Push once    MEDICATIONS  (PRN):

## 2022-01-12 NOTE — DISCHARGE NOTE NURSING/CASE MANAGEMENT/SOCIAL WORK - PATIENT PORTAL LINK FT
You can access the FollowMyHealth Patient Portal offered by [facility] by registering at the following website: [email or web address]. By joining [facility]’s FollowMyHealth portal, you will also be able to view your health information using other applications (apps) compatible with our system.

## 2022-01-12 NOTE — DISCHARGE NOTE PROVIDER - NSDCFUADDINST_GEN_ALL_CORE_FT
-You have one stitch where your chest tube was. Leave the dressing covering this for 48 hours. Then the stitch will be removed in the office next week.    -Your blood pressure and hear rate were on the lower side during your hospital stay. Your blood pressure medication was held today, restart your metoprolol tomorrow - 1/13/22.    -Walk daily as tolerated and use your incentive spirometer 10 times every hour while you are awake.     -Please continue to wear the compression stockings given to you in the hospital at home. This is a way to prevent fluid from building up in your legs.     -No driving or strenuous activity/exercise until cleared by your surgeon.    -Gently clean your incisions with unscented/antibacterial soap and water, pat dry.  You may leave them open to air.    -Call your doctor if you have shortness of breath, chest pain not relieved by pain medication, dizziness, fever >101.5, or increased redness or drainage from incisions.   -You have one stitch where your chest tube was. Leave the dressing covering this for 48 hours. Then the stitch will be removed in the office next week.    -Your blood pressure and heart rate were on the lower side during your hospital stay. Your blood pressure medication was held today. Before taking your metoprolol tomorrow, take your blood pressure and heart rate on your machine. If your heart rate is above 60 and your systolic (top) blood pressure is above 100 you can restart your metoprolol. If they are lower call our office at [phone].    -Walk daily as tolerated and use your incentive spirometer 10 times every hour while you are awake.     -Please continue to wear the compression stockings given to you in the hospital at home. This is a way to prevent fluid from building up in your legs.     -No driving or strenuous activity/exercise until cleared by your surgeon.    -Gently clean your incisions with unscented/antibacterial soap and water, pat dry.  You may leave them open to air.    -Call your doctor if you have shortness of breath, chest pain not relieved by pain medication, dizziness, fever >101.5, or increased redness or drainage from incisions.

## 2022-01-12 NOTE — DISCHARGE NOTE PROVIDER - HOSPITAL COURSE
51 year old female with a past medical hx of gastritis, chronic hep B carrier (on Baraclude and Viread since 2/2012) hypertension, uterine fibroid/leiomyoma of uterus, presents for an initial evaluation and management of abnormal CT on 12/4/21. CT showed  2.1cm mass to right middle lobe. MRI abdomen 11/18/21 showed 2cm pleural based enhancing mass in anterior RML. Referred to Dr. Morgan by Dr. Santiago Agosto. On 1/11/22     51 year old female with a past medical hx of gastritis, chronic hep B carrier (on Baraclude and Viread since 2/2012) hypertension, uterine fibroid/leiomyoma of uterus, presents for an initial evaluation and management of abnormal CT on 12/4/21. CT showed  2.1cm mass to right middle lobe. MRI abdomen 11/18/21 showed 2cm pleural based enhancing mass in anterior RML. Referred to Dr. Morgan by Dr. Santiago Agosto. 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 Morgan. She was brought to the PACU in stable condition and then transferred to the stepdown unit. On POD1 there was minimal output to her CT. CT was pulled and repeat CXR showed no pneumothorax. Patient feels well and is excited to go home. Tolerating PO diet, ambulating independently, and satting well on room air. Per Dr. Hernández she is medically stable for discharge home today, 1/12/21.    Over 35 minutes was spent with the patient reviewing the discharge material including medications, follow up appointments, recovery, concerning symptoms, and how to contact their health care providers if they have questions

## 2022-01-12 NOTE — DISCHARGE NOTE PROVIDER - PROVIDER TOKENS
PROVIDER:[TOKEN:[[id number]],SCHEDULEDAPPT:[01/21/2022]],PROVIDER:[TOKEN:[[id number]],FOLLOWUP:[2 weeks]]

## 2022-01-12 NOTE — DISCHARGE NOTE PROVIDER - NSDCMRMEDTOKEN_GEN_ALL_CORE_FT
metoprolol succinate 50 mg oral tablet, extended release: 1 tab(s) orally once a day  norethindrone 0.35 mg oral tablet: 1 tab(s) orally once a day  Vemlidy 25 mg oral tablet: 1 tab(s) orally once a day   acetaminophen 325 mg oral tablet: 2 tab(s) orally every 6 hours, As needed, Mild Pain (1 - 3)  metoprolol succinate 50 mg oral tablet, extended release: 1 tab(s) orally once a day  MiraLax oral powder for reconstitution: 17 gram(s) orally once a day, As Needed -for constipation   norethindrone 0.35 mg oral tablet: 1 tab(s) orally once a day  Vemlidy 25 mg oral tablet: 1 tab(s) orally once a day

## 2022-01-12 NOTE — DISCHARGE NOTE PROVIDER - CARE PROVIDER_API CALL
Zen Morgan (MD)  Surgery; Thoracic Surgery  [address] [place] [address], [place] [address]  Phone: [phone]  Fax: [phone]  Scheduled Appointment: 01/21/2022    [facility]  Internal Medicine  [address] [place] [address], [address]  [place] [address]  Phone: [phone]  Fax: ()-  Follow Up Time: 2 weeks

## 2022-01-12 NOTE — DISCHARGE NOTE PROVIDER - NSDCFUADDAPPT_GEN_ALL_CORE_FT
-Dr. Morgan's office will call you to schedule a time for your appointment on 1/21/22. If you do not hear from them by next Monday, call [phone].    -Dr. Santiago Agosto's office will call you to schedule an appointment. If you do not hear from them by next Monday, call [phone].

## 2022-01-12 NOTE — DISCHARGE NOTE NURSING/CASE MANAGEMENT/SOCIAL WORK - NSDCPEFALRISK_GEN_ALL_CORE
For information on Fall & Injury Prevention, visit: [email or web address] OR  [email or web address] OR  https://www.cdc.gov/steadi/patient.html

## 2022-01-12 NOTE — DISCHARGE NOTE NURSING/CASE MANAGEMENT/SOCIAL WORK - NSDCFUADDAPPT_GEN_ALL_CORE_FT
-Dr. Morgan's office will call you to schedule a time for your appointment on 1/21/22. If you do not hear from them by next Monday, call [phone].    -Dr. Santiago Agosto's office will call you to schedule an appointment. If you do not hear from them by next Monday, call [phone].

## 2022-01-12 NOTE — CHART NOTE - NSCHARTNOTEFT_GEN_A_CORE
CT Removal:    Pt seen and examined at bedside.  Case discussed with Dr. Hernández and is recommending removal of CT.  Minimal output from CT.  No air leak appreciated.  CT removed without incident.  Tie down in place and occlusive dressing placed. Follow up CXR with no obvious PTX noted.  Pt tolerated procedure well and remained hemodynamically stable.

## 2022-01-12 NOTE — PROGRESS NOTE ADULT - ASSESSMENT
Zen Morgan)  Surgery; Thoracic Surgery  [phone] [place] [address], [place] [address]  Phone: [phone]  Fax: [phone]  Scheduled Appointment: 01/21/2022    BETSY AGOSTO  Internal Medicine  [address] [place] [address], [address]  [place] [address]  Phone: [phone]  Fax: [phone]  Follow Up Time: 2 weeks    CHAIM RITTER ; 01/21/2022 ; [facility] [address]  -Dr. Morgan's office will call you to schedule a time for your appointment on 1/21/22. If you do not hear from them by next Monday, call [phone].    -Dr. Betsy Agosto's office will call you to schedule an appointment. If you do not hear from them by next Monday, call [phone].  
Neurovascular:   No delirium. Pain well controlled with current regimen.  -tylenol, toradol prn for pain     Cardiovascular:   PMHx HTN  -restart BB when appropriate     Respiratory:   s/p Right VATS RA wedge resection      - 1 CT on suction no airleak     -  plan for waterseal at midnight per Dr. Morgan  02 Sat = 98% on RA.  -Wean to RA from for O2 Sat > 93%.  -Encourage Cough, deep breathing and Use of IS 10x / hr while awake.  -Chest PT 4xdaily    GI:   Stable  PMHx chronic Hep B on home Vemlidy  -protonix for GI protection  -PO DASH diet    Renal / :   BUN/Cr Stable  -Continue to monitor I/O's.    Endocrine:    Blood sugar stable    no PMHx of DMII or Thyroid dysfunction    Hematologic:  H/H stable  -DVT prophylaxis with Heparin sq    ID:  -Afebrile  -Continue to observe for SIRS/Sepsis Syndrome.    Disposition:  Possible home tomorrow

## 2022-01-12 NOTE — DISCHARGE NOTE PROVIDER - NSDCCPTREATMENT_GEN_ALL_CORE_FT
PRINCIPAL PROCEDURE  Procedure: Wedge resection, lung, middle lobe, right, robot-assisted  Findings and Treatment: R VATS RA RML wedge resection

## 2022-01-18 LAB — SURGICAL PATHOLOGY STUDY: SIGNIFICANT CHANGE UP

## 2022-01-20 DIAGNOSIS — I10 ESSENTIAL (PRIMARY) HYPERTENSION: ICD-10-CM

## 2022-01-20 DIAGNOSIS — R91.8 OTHER NONSPECIFIC ABNORMAL FINDING OF LUNG FIELD: ICD-10-CM

## 2022-01-20 DIAGNOSIS — D49.1 NEOPLASM OF UNSPECIFIED BEHAVIOR OF RESPIRATORY SYSTEM: ICD-10-CM

## 2022-01-20 DIAGNOSIS — B18.1 CHRONIC VIRAL HEPATITIS B WITHOUT DELTA-AGENT: ICD-10-CM

## 2022-01-21 ENCOUNTER — APPOINTMENT (OUTPATIENT)
Dept: THORACIC SURGERY | Facility: CLINIC | Age: 52
End: 2022-01-21
Payer: COMMERCIAL

## 2022-01-21 ENCOUNTER — OUTPATIENT (OUTPATIENT)
Dept: OUTPATIENT SERVICES | Facility: HOSPITAL | Age: 52
LOS: 1 days | End: 2022-01-21
Payer: COMMERCIAL

## 2022-01-21 VITALS
RESPIRATION RATE: 18 BRPM | BODY MASS INDEX: 24.84 KG/M2 | HEART RATE: 73 BPM | TEMPERATURE: 97.5 F | SYSTOLIC BLOOD PRESSURE: 152 MMHG | HEIGHT: 62 IN | WEIGHT: 135 LBS | OXYGEN SATURATION: 95 % | DIASTOLIC BLOOD PRESSURE: 68 MMHG

## 2022-01-21 DIAGNOSIS — Z09 ENCOUNTER FOR FOLLOW-UP EXAMINATION AFTER COMPLETED TREATMENT FOR CONDITIONS OTHER THAN MALIGNANT NEOPLASM: ICD-10-CM

## 2022-01-21 PROCEDURE — 99024 POSTOP FOLLOW-UP VISIT: CPT

## 2022-01-21 PROCEDURE — 71046 X-RAY EXAM CHEST 2 VIEWS: CPT | Mod: 26

## 2022-01-21 PROCEDURE — 71046 X-RAY EXAM CHEST 2 VIEWS: CPT

## 2022-02-15 PROCEDURE — 85730 THROMBOPLASTIN TIME PARTIAL: CPT

## 2022-02-15 PROCEDURE — C1889: CPT

## 2022-02-15 PROCEDURE — 86850 RBC ANTIBODY SCREEN: CPT

## 2022-02-15 PROCEDURE — S2900: CPT

## 2022-02-15 PROCEDURE — 88307 TISSUE EXAM BY PATHOLOGIST: CPT

## 2022-02-15 PROCEDURE — 86900 BLOOD TYPING SEROLOGIC ABO: CPT

## 2022-02-15 PROCEDURE — 71045 X-RAY EXAM CHEST 1 VIEW: CPT

## 2022-02-15 PROCEDURE — C9399: CPT

## 2022-02-15 PROCEDURE — 88341 IMHCHEM/IMCYTCHM EA ADD ANTB: CPT

## 2022-02-15 PROCEDURE — 85027 COMPLETE CBC AUTOMATED: CPT

## 2022-02-15 PROCEDURE — 97161 PT EVAL LOW COMPLEX 20 MIN: CPT

## 2022-02-15 PROCEDURE — 84100 ASSAY OF PHOSPHORUS: CPT

## 2022-02-15 PROCEDURE — 85610 PROTHROMBIN TIME: CPT

## 2022-02-15 PROCEDURE — 86901 BLOOD TYPING SEROLOGIC RH(D): CPT

## 2022-02-15 PROCEDURE — 36415 COLL VENOUS BLD VENIPUNCTURE: CPT

## 2022-02-15 PROCEDURE — 83735 ASSAY OF MAGNESIUM: CPT

## 2022-02-15 PROCEDURE — 80048 BASIC METABOLIC PNL TOTAL CA: CPT

## 2025-06-12 NOTE — PRE-OP CHECKLIST - NS PREOP CHK MONITOR ANESTHESIA CONSENT
Pt reported just performed with PT and declined to perform again 2* increased pain in R UE with movement done

## (undated) DEVICE — GLV 7.5 PROTEXIS (WHITE)

## (undated) DEVICE — ELCTR BOVIE TIP SPATULA MEGADYNE E-Z CLEAN LAPAROSCOPIC 13.5" STANDARD

## (undated) DEVICE — ENDOCATCH GENERAL 15MM (PURPLE)

## (undated) DEVICE — XI SEAL UNIV 5- 8 MM

## (undated) DEVICE — DRSG GAUZE PACKTNER ROLL

## (undated) DEVICE — XI DRAPE COLUMN

## (undated) DEVICE — DVC ASCOPE 4 SNGL USE SLIM

## (undated) DEVICE — D HELP - CLEARVIEW CLEARIFY SYSTEM

## (undated) DEVICE — TAPE SILK 3"

## (undated) DEVICE — XI ARM FORCEP FENESTRATED BIPOLAR 8MM

## (undated) DEVICE — WARMING BLANKET LOWER ADULT

## (undated) DEVICE — XI DRAPE ARM

## (undated) DEVICE — TROCAR ETHICON ENDOPATH XCEL BLADELESS 15MM X 100MM STABILITY

## (undated) DEVICE — TROCAR ETHICON ENDOPATH XCEL BLADELESS 5MM X 100MM STABILITY

## (undated) DEVICE — SUT PROLENE 0 30" CT-1

## (undated) DEVICE — ELCTR GROUNDING PAD ADULT COVIDIEN

## (undated) DEVICE — PACK ROBOTIC

## (undated) DEVICE — STAPLER COVIDIEN ENDO GIA STANDARD HANDLE

## (undated) DEVICE — TROCAR ETHICON ENDOPATH XCEL BLADELESS 12MM X 100MM SMOOTH

## (undated) DEVICE — Device

## (undated) DEVICE — XI 12MM AND STAPLER CANNULA SEAL

## (undated) DEVICE — TUBING STRYKER PNEUMOCLEAR HEAT HUMID

## (undated) DEVICE — VENODYNE/SCD SLEEVE CALF MEDIUM

## (undated) DEVICE — ENDOCATCH GENERAL 10MM (PURPLE)

## (undated) DEVICE — CHEST DRAIN OASIS DRY SUCTION WATER SEAL

## (undated) DEVICE — XI OBTURATOR OPTICAL BLADELESS 8MM

## (undated) DEVICE — XI VESSEL SEALER